# Patient Record
Sex: MALE | Race: WHITE | HISPANIC OR LATINO | Employment: OTHER | URBAN - METROPOLITAN AREA
[De-identification: names, ages, dates, MRNs, and addresses within clinical notes are randomized per-mention and may not be internally consistent; named-entity substitution may affect disease eponyms.]

---

## 2024-05-01 ENCOUNTER — HOSPITAL ENCOUNTER (EMERGENCY)
Facility: HOSPITAL | Age: 64
Discharge: HOME | End: 2024-05-01
Attending: EMERGENCY MEDICINE
Payer: COMMERCIAL

## 2024-05-01 VITALS
SYSTOLIC BLOOD PRESSURE: 186 MMHG | OXYGEN SATURATION: 97 % | TEMPERATURE: 98 F | BODY MASS INDEX: 33.18 KG/M2 | WEIGHT: 224 LBS | HEART RATE: 77 BPM | HEIGHT: 69 IN | DIASTOLIC BLOOD PRESSURE: 92 MMHG | RESPIRATION RATE: 20 BRPM

## 2024-05-01 DIAGNOSIS — M47.816 SPONDYLOSIS OF LUMBAR SPINE: ICD-10-CM
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
## 2024-05-01 DIAGNOSIS — S70.02XA CONTUSION OF LEFT HIP, INITIAL ENCOUNTER: ICD-10-CM

## 2024-05-01 DIAGNOSIS — M25.552 LEFT HIP PAIN: ICD-10-CM

## 2024-05-01 DIAGNOSIS — M54.50 ACUTE LEFT-SIDED LOW BACK PAIN WITHOUT SCIATICA: ICD-10-CM

## 2024-05-01 PROCEDURE — 25000003 PHARM REV CODE 250

## 2024-05-01 PROCEDURE — 99284 EMERGENCY DEPT VISIT MOD MDM: CPT | Mod: 25

## 2024-05-01 RX ORDER — NAPROXEN 500 MG/1
500 TABLET ORAL 2 TIMES DAILY PRN
Qty: 30 TABLET | Refills: 0 | Status: SHIPPED | OUTPATIENT
Start: 2024-05-01

## 2024-05-01 RX ORDER — LIDOCAINE 50 MG/G
1 PATCH TOPICAL DAILY
Qty: 15 PATCH | Refills: 0 | Status: SHIPPED | OUTPATIENT
Start: 2024-05-01

## 2024-05-01 RX ORDER — LIDOCAINE 50 MG/G
2 PATCH TOPICAL
Status: DISCONTINUED | OUTPATIENT
Start: 2024-05-01 | End: 2024-05-01 | Stop reason: HOSPADM

## 2024-05-01 RX ORDER — TRAMADOL HYDROCHLORIDE 50 MG/1
50 TABLET ORAL EVERY 6 HOURS PRN
Qty: 12 TABLET | Refills: 0 | Status: SHIPPED | OUTPATIENT
Start: 2024-05-01

## 2024-05-01 RX ORDER — ACETAMINOPHEN 500 MG
1000 TABLET ORAL
Status: COMPLETED | OUTPATIENT
Start: 2024-05-01 | End: 2024-05-01

## 2024-05-01 RX ADMIN — LIDOCAINE 2 PATCH: 50 PATCH TOPICAL at 11:05

## 2024-05-01 RX ADMIN — ACETAMINOPHEN 1000 MG: 500 TABLET ORAL at 11:05

## 2024-05-01 NOTE — DISCHARGE INSTRUCTIONS
Jamey por venir a nuestro Departamento de Emergencias hoy. Es importante recordar que algunos problemas o condiciones médicas son difíciles de diagnosticar y es posible que no se encuentren o aborden marbin starr visita al Departamento de Emergencias. Estas condiciones a menudo comienzan con síntomas inespecíficos y solo se pueden diagnosticar en visitas de seguimiento con starr médico de atención primaria o especialista cuando los síntomas continúan o cambian. Recuerde que todas las condiciones médicas pueden cambiar y no podemos predecir cómo se sentirá mañana o al día siguiente. Regrese a la gonzalo de emergencias si tiene preguntas/inquietudes, síntomas nuevos/preocupantes, empeoramiento o falta de mejora.    Asegúrese de hacer un seguimiento con starr médico de atención primaria y revisar con ellos todos los laboratorios/imágenes/pruebas que se realizaron marbin starr visita a la gonzalo de emergencias. Es muy común que identifiquemos hallazgos incidentales no emergentes que deben ser objeto de seguimiento con starr médico de atención primaria. Algunos laboratorios/imágenes/pruebas pueden estar fuera del rango normal y requieren un seguimiento que no sea de emergencia y/o más investigación/tratamiento/procedimientos/pruebas para ayudar a diagnosticar/excluir/prevenir complicaciones u otras afecciones médicas potencialmente graves. Algunas anormalidades pueden no lily sido discutidas o abordadas marbin starr visita a la gonzalo de emergencias. Es posible que algunos resultados de laboratorio no regresen marbin starr visita a la gonzalo de emergencias, jana se puede acceder a ellos descargando la aplicación gratuita [facility] o visitando [email or web address] Es importante que revise con starr médico todas las pruebas de laboratorio/imágenes/pruebas que estén fuera del rango normal.    Parker visita a la gonzalo de emergencias no reemplaza parker visita de atención primaria, y muchas pruebas de detección o de seguimiento no pueden ser  ordenadas por un médico de emergencia ni realizadas por la gonzalo de emergencias. Algunas pruebas pueden incluso requerir aprobación previa.    Si no tiene un médico de atención primaria, puede comunicarse con el que figura en la documentación del jesse o también puede llamar al mostrador de citas de la [facility] al [phone] o a [address] [place] [address] al [phone] para programar parker michelle. michelle, o establecer atención con un médico de atención primaria o incluso un especialista y para obtener información sobre los recursos locales. Es importante para starr maira que tenga un médico de atención primaria.    [place] todos los medicamentos según las indicaciones. Hemos hecho todo lo posible para seleccionar un medicamento para usted que tratará starr condición; sin embargo, todos los medicamentos pueden tener efectos secundarios y es imposible predecir qué medicamentos pueden causarle efectos secundarios o cuáles (si los hay) esos medicamentos le pueden pravin. Si siente que está teniendo un efecto negativo o un efecto secundario de algún medicamento, debe dejar de jessica esos medicamentos de inmediato y buscar atención médica. Si siente que está teniendo parker reacción potencialmente mortal, llame al 911.    No conduzca, nade, suba a [place], se bañe, opere maquinaria pesada, ky alcohol o tome medicamentos potencialmente sedantes, firme ningún documento legal o tome decisiones importantes marbin 24 horas si ha recibido algún medicamento para el dolor, sedantes o alteradores del estado de ánimo. medicamentos marbin starr visita a la gonzalo de emergencias o dentro de las 24 horas de haberlos tomado si se los gilmore recetado.    Puede encontrar recursos adicionales para dentistas, audífonos, equipos médicos duraderos, farmacias de bajo costo y otros recursos en [email or web address]

## 2024-05-01 NOTE — ED PROVIDER NOTES
Encounter Date: 5/1/2024    SCRIBE #1 NOTE: I, Portia Rosales, am scribing for, and in the presence of,  Alayna Holdsworth, PA-C. I have scribed the following portions of the note - Other sections scribed: HPI, ROS.       History     Chief Complaint   Patient presents with    Hip Pain     Pt reports fall x 4 days ago and is having back and left hip pain.  Pt took tylenol without relief.      63-year-old male with a past medical history of HTN and DM, presents to the ED with Left Hip Pain, s/p trip and fall on Saturday. Patient also reports his hip pain as a stabbing 5/10. Patient states he fell on his buttocks and heard a cracking sound from his back. Denies blood thinner use. Patient reports left leg decreased sensation. Patient denies LOC, head trauma, nausea, emesis, bowel incontinence, difficulty urinating, paresthesia, numbness, wounds, swelling, color change, or other associated symptoms. Patient attempted Tylenol and Ibuprofen without relief. No other alleviating or exacerbating factors. This is the extent of the patient's complaints in the ED. NKDA.              The history is provided by the patient. The history is limited by a language barrier. A  was used ([id number]).     Review of patient's allergies indicates:  No Known Allergies  No past medical history on file.  No past surgical history on file.  No family history on file.  Social History     Tobacco Use    Smoking status: Never    Smokeless tobacco: Never   Substance Use Topics    Alcohol use: Not Currently     Review of Systems   Eyes:  Negative for visual disturbance.   Gastrointestinal:  Negative for abdominal pain, constipation, diarrhea, nausea and vomiting.        (-) bowel incontinence   Genitourinary:  Negative for decreased urine volume, difficulty urinating, dysuria, frequency and hematuria.        (-) urinary retention   Musculoskeletal:  Positive for arthralgias (left hip) and back pain (lower left). Negative for joint  swelling.   Skin:  Negative for color change, pallor, rash and wound.        (-) swelling to back   Neurological:  Negative for dizziness, syncope, weakness, light-headedness, numbness and headaches.        (-) paresthesia  (-) LOC  (-) head trauma       Physical Exam     Initial Vitals [05/01/24 1011]   BP Pulse Resp Temp SpO2   (!) 186/92 77 20 98 °F (36.7 °C) 97 %      MAP       --         Physical Exam    Nursing note and vitals reviewed.  Constitutional: He appears well-developed and well-nourished. He is not diaphoretic. He is active. He does not appear ill. No distress.   HENT:   Head: Normocephalic and atraumatic.   Right Ear: External ear normal.   Left Ear: External ear normal.   Nose: Nose normal.   Eyes: Conjunctivae and lids are normal. Pupils are equal, round, and reactive to light. Right eye exhibits no discharge. Left eye exhibits no discharge. No scleral icterus.   Neck: Phonation normal. Neck supple.   Normal range of motion.   Full passive range of motion without pain.     Cardiovascular:  Normal rate and regular rhythm.           Pulmonary/Chest: Effort normal and breath sounds normal. No respiratory distress.   Abdominal: Abdomen is soft. He exhibits no distension. There is no abdominal tenderness.   Musculoskeletal:         General: Normal range of motion.      Cervical back: Normal, full passive range of motion without pain, normal range of motion and neck supple. No spinous process tenderness.      Thoracic back: Normal.      Lumbar back: Tenderness (left) present. No swelling or bony tenderness.        Back:       Left hip: Bony tenderness (lateral) present. No deformity. Normal range of motion.      Left upper leg: Normal.      Comments: Normal strength and sensation.      Neurological: He is alert and oriented to person, place, and time. He has normal strength. No sensory deficit. GCS eye subscore is 4. GCS verbal subscore is 5. GCS motor subscore is 6.   Skin: Skin is dry. Capillary  Incubation Start Time: 9:20 refill takes less than 2 seconds.         ED Course   Procedures  Labs Reviewed - No data to display       Imaging Results              X-Ray Hip 2 or 3 views Left (with Pelvis when performed) (Final result)  Result time 05/01/24 10:56:07      Final result by Malina Willis MD (05/01/24 10:56:07)                   Impression:      No acute process seen.      Electronically signed by: Malina Willis MD  Date:    05/01/2024  Time:    10:56               Narrative:    EXAMINATION:  XR HIP WITH PELVIS WHEN PERFORMED, 2 OR 3 VIEWS LEFT    CLINICAL HISTORY:  Pain in left hip    TECHNIQUE:  AP view    COMPARISON:  None    FINDINGS:  The bilateral hip joint space appear normal on the AP pelvic view.  Normal left femoral head contour.  No fracture, no osseous lesions, no advanced degenerative change.    The bilateral sacroiliac joints appear normal.    The remainder of the visualized osseous structures and soft tissues appear normal.                                       X-Ray Lumbar Spine Ap And Lateral (Final result)  Result time 05/01/24 10:57:13      Final result by Malina Willis MD (05/01/24 10:57:13)                   Impression:      Spondylosis of the lumbar spine, no acute process seen.      Electronically signed by: Malina Willis MD  Date:    05/01/2024  Time:    10:57               Narrative:    EXAMINATION:  XR LUMBAR SPINE AP AND LATERAL    CLINICAL HISTORY:  fall;    TECHNIQUE:  AP, lateral and spot images were performed of the lumbar spine.    COMPARISON:  None    FINDINGS:  The alignment of the lumbar spine is normal.    The vertebral body heights are well maintained.    The disc spaces are well maintained.    Moderate-sized anterior and lateral marginal osteophytes noted at T11 through S1.    No fracture, no osseous lesions.    The sacroiliac joints appear symmetrical on the AP view.    The soft tissues appear normal.                                       Medications   LIDOcaine 5  % patch 2 patch (2 patches Transdermal Patch Applied 5/1/24 1102)   acetaminophen tablet 1,000 mg (1,000 mg Oral Given 5/1/24 1101)     Medical Decision Making  63-year-old male with a past medical history of HTN and DM, presents to the ED with Left Hip Pain, s/p trip and fall on Saturday.   Patient's chart and medical history reviewed.    Ddx:  Lumbar strain  Lumbar fracture  Herniated disc  Cauda Equina  Spinal Abscess  Contusion     Patient's vitals reviewed.  Afebrile, no respiratory distress, and nontoxic-appearing in the ED. patient had left lumbar and left lateral hip tenderness to palpation.  No midline tenderness.  Normal strength.  Normal sensation.  Patient given a lidocaine patch and Tylenol for pain.  X-rays unremarkable per my personal interpretation.  Official x-ray showed no acute process seen in the left hip.  Spondylosis of the lumbar spine with no acute process seen.  Discussed with patient this is most likely bone contusions from his fall which will take time to heal.  Instructed patient to rest, ice, and heat. Considered but unlikely cauda equina syndrome due to LINDSEY, no saddle antesthesia, bowel incontinence, urinary retention, or numbness.  Considered but unlikely a spinal abscess due to no history of IVDU, fevers, fluctuance, neuro deficits, or weakness.  Patient will be sent home on naproxen, lidocaine patches, and a short course of tramadol for symptomatic control. I have reviewed the  for this patient. Discussed with her that she must leave patch on for 12 hours then leave off for 12 hours, she verbalized understanding. Patient agrees with this plan. Discussed with her strict return precautions, she verbalized understanding. Patient is stable for discharge.     Amount and/or Complexity of Data Reviewed  Radiology: ordered.    Risk  OTC drugs.  Prescription drug management.            Scribe Attestation:   Scribe #1: I performed the above scribed service and the documentation accurately  describes the services I performed. I attest to the accuracy of the note.           I, Alayna Holdsworth,PA-C, personally performed the services described in this documentation. All medical record entries made by the scribe were at my direction and in my presence.  I have reviewed the chart and agree that the record reflects my personal performance and is accurate and complete.                      Clinical Impression:  Final diagnoses:  [M25.552] Left hip pain  [W19.XXXA] Fall, initial encounter (Primary)  [S70.02XA] Contusion of left hip, initial encounter  [M54.50] Acute left-sided low back pain without sciatica  [M47.816] Spondylosis of lumbar spine          ED Disposition Condition    Discharge Stable          ED Prescriptions       Medication Sig Dispense Start Date End Date Auth. Provider    traMADoL (ULTRAM) 50 mg tablet Take 1 tablet (50 mg total) by mouth every 6 (six) hours as needed for Pain. 12 tablet 5/1/2024 -- Holdsworth, Alayna, PA-C    naproxen (NAPROSYN) 500 MG tablet Take 1 tablet (500 mg total) by mouth 2 (two) times daily as needed (Pain). 30 tablet 5/1/2024 -- Holdsworth, Alayna, PA-C    LIDOcaine (LIDODERM) 5 % Place 1 patch onto the skin once daily. Remove & Discard patch within 12 hours then leave off for 12 hours 15 patch 5/1/2024 -- Holdsworth, Alayna, PA-C          Follow-up Information       Follow up With Specialties Details Why Contact [facility] Emergency Dept Emergency Medicine  If symptoms worsen [address] [facility] [place] [address] [phone]             Holdsworth, Alayna, PA-C  05/01/24 2267

## 2024-05-04 ENCOUNTER — HOSPITAL ENCOUNTER (EMERGENCY)
Facility: HOSPITAL | Age: 64
Discharge: HOME | End: 2024-05-04
Attending: EMERGENCY MEDICINE
Payer: COMMERCIAL

## 2024-05-04 VITALS
BODY MASS INDEX: 32.58 KG/M2 | TEMPERATURE: 98 F | SYSTOLIC BLOOD PRESSURE: 158 MMHG | DIASTOLIC BLOOD PRESSURE: 76 MMHG | HEIGHT: 69 IN | HEART RATE: 66 BPM | OXYGEN SATURATION: 100 % | RESPIRATION RATE: 20 BRPM | WEIGHT: 220 LBS

## 2024-05-04 DIAGNOSIS — I10 HYPERTENSION, UNSPECIFIED TYPE: ICD-10-CM

## 2024-05-04 DIAGNOSIS — R07.89 ACUTE CHEST WALL PAIN: ICD-10-CM

## 2024-05-04 DIAGNOSIS — S00.81XA ABRASION OF FACE, INITIAL ENCOUNTER: ICD-10-CM

## 2024-05-04 DIAGNOSIS — S06.0X1A CONCUSSION WITH LOSS OF CONSCIOUSNESS OF 30 MINUTES OR LESS, INITIAL ENCOUNTER: Primary | ICD-10-CM

## 2024-05-04 DIAGNOSIS — E11.9 TYPE 2 DIABETES MELLITUS WITHOUT COMPLICATION, WITHOUT LONG-TERM CURRENT USE OF INSULIN: ICD-10-CM
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
## 2024-05-04 DIAGNOSIS — S40.012A CONTUSION OF LEFT SHOULDER, INITIAL ENCOUNTER: ICD-10-CM

## 2024-05-04 LAB
ALBUMIN SERPL BCP-MCNC: 4 G/DL (ref 3.5–5.2)
ALLENS TEST: ABNORMAL
ALP SERPL-CCNC: 98 U/L (ref 55–135)
ALT SERPL W/O P-5'-P-CCNC: 25 U/L (ref 10–44)
ANION GAP SERPL CALC-SCNC: 10 MMOL/L (ref 8–16)
ANION GAP SERPL CALC-SCNC: 13 MMOL/L (ref 8–16)
AST SERPL-CCNC: 22 U/L (ref 10–40)
BASOPHILS # BLD AUTO: 0.03 K/UL (ref 0–0.2)
BASOPHILS NFR BLD: 0.5 % (ref 0–1.9)
BILIRUB SERPL-MCNC: 0.3 MG/DL (ref 0.1–1)
BILIRUB UR QL STRIP: NEGATIVE
BNP SERPL-MCNC: <10 PG/ML (ref 0–99)
BUN SERPL-MCNC: 22 MG/DL (ref 6–30)
BUN SERPL-MCNC: 23 MG/DL (ref 8–23)
CALCIUM SERPL-MCNC: 9.4 MG/DL (ref 8.7–10.5)
CHLORIDE SERPL-SCNC: 105 MMOL/L (ref 95–110)
CHLORIDE SERPL-SCNC: 106 MMOL/L (ref 95–110)
CLARITY UR: CLEAR
CO2 SERPL-SCNC: 21 MMOL/L (ref 23–29)
COLOR UR: COLORLESS
CREAT SERPL-MCNC: 0.8 MG/DL (ref 0.5–1.4)
CREAT SERPL-MCNC: 0.9 MG/DL (ref 0.5–1.4)
DIFFERENTIAL METHOD BLD: ABNORMAL
EOSINOPHIL # BLD AUTO: 0.1 K/UL (ref 0–0.5)
EOSINOPHIL NFR BLD: 1 % (ref 0–8)
ERYTHROCYTE [DISTWIDTH] IN BLOOD BY AUTOMATED COUNT: 13.3 % (ref 11.5–14.5)
EST. GFR  (NO RACE VARIABLE): >60 ML/MIN/1.73 M^2
GLUCOSE SERPL-MCNC: 138 MG/DL (ref 70–110)
GLUCOSE SERPL-MCNC: 143 MG/DL (ref 70–110)
GLUCOSE UR QL STRIP: NEGATIVE
HCT VFR BLD AUTO: 38.4 % (ref 40–54)
HCT VFR BLD CALC: 40 %PCV (ref 36–54)
HGB BLD-MCNC: 12.8 G/DL (ref 14–18)
HGB UR QL STRIP: NEGATIVE
IMM GRANULOCYTES # BLD AUTO: 0.01 K/UL (ref 0–0.04)
IMM GRANULOCYTES NFR BLD AUTO: 0.2 % (ref 0–0.5)
KETONES UR QL STRIP: NEGATIVE
LEUKOCYTE ESTERASE UR QL STRIP: NEGATIVE
LYMPHOCYTES # BLD AUTO: 2.1 K/UL (ref 1–4.8)
LYMPHOCYTES NFR BLD: 35.1 % (ref 18–48)
MCH RBC QN AUTO: 27.6 PG (ref 27–31)
MCHC RBC AUTO-ENTMCNC: 33.3 G/DL (ref 32–36)
MCV RBC AUTO: 83 FL (ref 82–98)
MONOCYTES # BLD AUTO: 0.3 K/UL (ref 0.3–1)
MONOCYTES NFR BLD: 5.1 % (ref 4–15)
NEUTROPHILS # BLD AUTO: 3.5 K/UL (ref 1.8–7.7)
NEUTROPHILS NFR BLD: 58.1 % (ref 38–73)
NITRITE UR QL STRIP: NEGATIVE
NRBC BLD-RTO: 0 /100 WBC
OHS QRS DURATION: 90 MS
OHS QTC CALCULATION: 424 MS
PH UR STRIP: 7 [PH] (ref 5–8)
PLATELET # BLD AUTO: 296 K/UL (ref 150–450)
PMV BLD AUTO: 8.5 FL (ref 9.2–12.9)
POC IONIZED CALCIUM: 1.21 MMOL/L (ref 1.06–1.42)
POC TCO2 (MEASURED): 24 MMOL/L (ref 23–29)
POCT GLUCOSE: 135 MG/DL (ref 70–110)
POTASSIUM BLD-SCNC: 4 MMOL/L (ref 3.5–5.1)
POTASSIUM SERPL-SCNC: 4.1 MMOL/L (ref 3.5–5.1)
PROT SERPL-MCNC: 7.2 G/DL (ref 6–8.4)
PROT UR QL STRIP: NEGATIVE
RBC # BLD AUTO: 4.64 M/UL (ref 4.6–6.2)
SAMPLE: ABNORMAL
SITE: ABNORMAL
SODIUM BLD-SCNC: 137 MMOL/L (ref 136–145)
SODIUM SERPL-SCNC: 137 MMOL/L (ref 136–145)
SP GR UR STRIP: 1.01 (ref 1–1.03)
TROPONIN I SERPL DL<=0.01 NG/ML-MCNC: <0.006 NG/ML (ref 0–0.03)
URN SPEC COLLECT METH UR: ABNORMAL
UROBILINOGEN UR STRIP-ACNC: NEGATIVE EU/DL
WBC # BLD AUTO: 5.93 K/UL (ref 3.9–12.7)

## 2024-05-04 PROCEDURE — 80053 COMPREHEN METABOLIC PANEL: CPT | Performed by: EMERGENCY MEDICINE

## 2024-05-04 PROCEDURE — 25000003 PHARM REV CODE 250: Performed by: EMERGENCY MEDICINE

## 2024-05-04 PROCEDURE — 93010 ELECTROCARDIOGRAM REPORT: CPT | Mod: ,,, | Performed by: INTERNAL MEDICINE

## 2024-05-04 PROCEDURE — 99900035 HC TECH TIME PER 15 MIN (STAT)

## 2024-05-04 PROCEDURE — 82962 GLUCOSE BLOOD TEST: CPT

## 2024-05-04 PROCEDURE — 93005 ELECTROCARDIOGRAM TRACING: CPT

## 2024-05-04 PROCEDURE — 85014 HEMATOCRIT: CPT

## 2024-05-04 PROCEDURE — 82330 ASSAY OF CALCIUM: CPT

## 2024-05-04 PROCEDURE — 25500020 PHARM REV CODE 255: Performed by: EMERGENCY MEDICINE

## 2024-05-04 PROCEDURE — 85025 COMPLETE CBC W/AUTO DIFF WBC: CPT | Performed by: EMERGENCY MEDICINE

## 2024-05-04 PROCEDURE — 84295 ASSAY OF SERUM SODIUM: CPT

## 2024-05-04 PROCEDURE — 82565 ASSAY OF CREATININE: CPT

## 2024-05-04 PROCEDURE — 84484 ASSAY OF TROPONIN QUANT: CPT | Performed by: EMERGENCY MEDICINE

## 2024-05-04 PROCEDURE — 81003 URINALYSIS AUTO W/O SCOPE: CPT | Performed by: EMERGENCY MEDICINE

## 2024-05-04 PROCEDURE — 84132 ASSAY OF SERUM POTASSIUM: CPT

## 2024-05-04 PROCEDURE — 83880 ASSAY OF NATRIURETIC PEPTIDE: CPT | Performed by: EMERGENCY MEDICINE

## 2024-05-04 PROCEDURE — 99285 EMERGENCY DEPT VISIT HI MDM: CPT | Mod: 25

## 2024-05-04 RX ORDER — LISINOPRIL 20 MG/1
20 TABLET ORAL
COMMUNITY
Start: 2024-02-19

## 2024-05-04 RX ORDER — ONDANSETRON HYDROCHLORIDE 2 MG/ML
8 INJECTION, SOLUTION INTRAVENOUS
Status: DISCONTINUED | OUTPATIENT
Start: 2024-05-04 | End: 2024-05-04 | Stop reason: HOSPADM

## 2024-05-04 RX ORDER — IBUPROFEN 600 MG/1
600 TABLET ORAL EVERY 6 HOURS PRN
Qty: 20 TABLET | Refills: 0 | Status: SHIPPED | OUTPATIENT
Start: 2024-05-04

## 2024-05-04 RX ORDER — ACETAMINOPHEN 500 MG
500 TABLET ORAL EVERY 6 HOURS PRN
Qty: 30 TABLET | Refills: 0 | Status: SHIPPED | OUTPATIENT
Start: 2024-05-04

## 2024-05-04 RX ORDER — MUPIROCIN 20 MG/G
1 OINTMENT TOPICAL
Status: COMPLETED | OUTPATIENT
Start: 2024-05-04 | End: 2024-05-04

## 2024-05-04 RX ORDER — METHOCARBAMOL 500 MG/1
1000 TABLET, FILM COATED ORAL 3 TIMES DAILY
Qty: 30 TABLET | Refills: 0 | Status: SHIPPED | OUTPATIENT
Start: 2024-05-04 | End: 2024-05-09

## 2024-05-04 RX ORDER — MUPIROCIN 20 MG/G
OINTMENT TOPICAL 3 TIMES DAILY
Qty: 60 G | Refills: 0 | Status: SHIPPED | OUTPATIENT
Start: 2024-05-04 | End: 2024-05-14

## 2024-05-04 RX ORDER — OXYCODONE AND ACETAMINOPHEN 5; 325 MG/1; MG/1
1 TABLET ORAL EVERY 6 HOURS PRN
Qty: 12 TABLET | Refills: 0 | Status: SHIPPED | OUTPATIENT
Start: 2024-05-04

## 2024-05-04 RX ORDER — METFORMIN HYDROCHLORIDE 1000 MG/1
1000 TABLET ORAL 2 TIMES DAILY
COMMUNITY
Start: 2024-02-19

## 2024-05-04 RX ORDER — MORPHINE SULFATE 4 MG/ML
4 INJECTION, SOLUTION INTRAMUSCULAR; INTRAVENOUS
Status: DISCONTINUED | OUTPATIENT
Start: 2024-05-04 | End: 2024-05-04

## 2024-05-04 RX ORDER — CEPHALEXIN 500 MG/1
500 CAPSULE ORAL EVERY 8 HOURS
Qty: 21 CAPSULE | Refills: 0 | Status: SHIPPED | OUTPATIENT
Start: 2024-05-04 | End: 2024-05-11

## 2024-05-04 RX ORDER — OXYCODONE AND ACETAMINOPHEN 5; 325 MG/1; MG/1
1 TABLET ORAL
Status: COMPLETED | OUTPATIENT
Start: 2024-05-04 | End: 2024-05-04

## 2024-05-04 RX ORDER — GLIMEPIRIDE 4 MG/1
4 TABLET ORAL 2 TIMES DAILY
COMMUNITY
Start: 2024-02-19

## 2024-05-04 RX ADMIN — OXYCODONE HYDROCHLORIDE AND ACETAMINOPHEN 1 TABLET: 5; 325 TABLET ORAL at 01:05

## 2024-05-04 RX ADMIN — MUPIROCIN 1 TUBE: 20 OINTMENT TOPICAL at 08:05

## 2024-05-04 RX ADMIN — IOHEXOL 75 ML: 350 INJECTION, SOLUTION INTRAVENOUS at 10:05
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
## 2024-05-04 NOTE — Clinical Note
"Haresh"Swathi Burden was seen and treated in our emergency department on 5/4/2024.  He may return to work on 05/07/2024.       If you have any questions or concerns, please don't hesitate to call.      Génesis Lopez, DO"

## 2024-05-04 NOTE — DISCHARGE INSTRUCTIONS
Return to the ED if symptoms worsen or do not improve.    CT shows: No evidence of acute intrathoracic, intra-abdominal or intrapelvic organ injuries.   Right thyroid lobe nodule.   Subsegmental atelectasis at the lingula.   No aortic dissection.   Prostatomegaly.   Benign left renal cyst.   Sigmoid diverticulosis.     Please sign up for and monitor all results on [facility] patient portal.    Please return to the ED for any new, concerning symptoms, or worsening symptoms.    Please follow-up with your primary care provider within 1 day.  An ER visit can not replace the evaluation by your primary care physician.    In the emergency department it is our goal to rule out life-threatening conditions and make sure that you are safe to be discharged home.    Many medical conditions are difficult to diagnose and may be impossible to diagnosed during a single ER visit.  Many health conditions start with nonspecific symptoms and can only be diagnosed on follow-up visits with your primary care physician or specialist.    Please be aware that all medical conditions can change and we can not predict how you will be feeling tomorrow or the next day.   If you have any worsening or new symptoms you should not hesitate to return to the emergency department for re-evaluation.  Be sure to follow up with your primary care physician for recheck and review any labs or imaging that were performed today.  It is very common for us to identify non emergent incidental findings on labs and imaging which must be followed up with your primary care physician.   If you do not have a primary care physician, you may contact the 1 listed on your discharge paperwork or you can also call the [facility] appointment desk at [phone] to schedule an appointment.

## 2024-05-04 NOTE — ED NOTES
Pt ambulating without difficulty, pt denies any pain or trauma, no signs or distress noted. Pt cheerful during assessment.